# Patient Record
Sex: MALE | Race: BLACK OR AFRICAN AMERICAN | NOT HISPANIC OR LATINO
[De-identification: names, ages, dates, MRNs, and addresses within clinical notes are randomized per-mention and may not be internally consistent; named-entity substitution may affect disease eponyms.]

---

## 2021-12-15 ENCOUNTER — APPOINTMENT (OUTPATIENT)
Dept: PEDIATRIC ORTHOPEDIC SURGERY | Facility: CLINIC | Age: 71
End: 2021-12-15
Payer: MEDICARE

## 2021-12-15 ENCOUNTER — TRANSCRIPTION ENCOUNTER (OUTPATIENT)
Age: 71
End: 2021-12-15

## 2021-12-15 VITALS — HEIGHT: 70 IN | WEIGHT: 236 LBS | BODY MASS INDEX: 33.79 KG/M2

## 2021-12-15 DIAGNOSIS — S46.012A STRAIN OF MUSCLE(S) AND TENDON(S) OF THE ROTATOR CUFF OF LEFT SHOULDER, INITIAL ENCOUNTER: ICD-10-CM

## 2021-12-15 DIAGNOSIS — Z86.79 PERSONAL HISTORY OF OTHER DISEASES OF THE CIRCULATORY SYSTEM: ICD-10-CM

## 2021-12-15 DIAGNOSIS — Z86.39 PERSONAL HISTORY OF OTHER ENDOCRINE, NUTRITIONAL AND METABOLIC DISEASE: ICD-10-CM

## 2021-12-15 PROCEDURE — 99202 OFFICE O/P NEW SF 15 MIN: CPT

## 2021-12-15 RX ORDER — METFORMIN HYDROCHLORIDE 625 MG/1
TABLET ORAL
Refills: 0 | Status: ACTIVE | COMMUNITY

## 2021-12-15 RX ORDER — APIXABAN 5 MG/1
TABLET, FILM COATED ORAL
Refills: 0 | Status: ACTIVE | COMMUNITY

## 2021-12-15 NOTE — PHYSICAL EXAM
[de-identified] : On exam he has supple motion to the cervical spine without spasm or tenderness.  The left shoulder reveals no evidence of atrophy/swelling.  He has excellent motion on today's visit with no significant objective points of tenderness or instability on stress.  His strength is quite good for his age and nondominant side.  He is neurologically intact.\par \par Again I have reviewed his MRI findings which basically revealed tendinosis only with no significant tear or arthritic component present

## 2021-12-15 NOTE — ASSESSMENT
[FreeTextEntry1] : Impression: Strain rotator cuff left shoulder.\par \par He will be treated with formal physical therapy the potential for injection has been discussed.  Nonsteroidals would not be prescribed because of his cardiac situation and Eliquis he will return as necessary

## 2021-12-15 NOTE — HISTORY OF PRESENT ILLNESS
[de-identified] : This 71-year-old right-handed gentleman who is retired is seen for evaluation of his left shoulder.  He states he has had a long history of discomfort to his shoulder with mild increase in his symptomatology over the past 6 weeks or so.  Never any history of injury.  His pain does not radiate distally no neck pain numbness or paresthesias.  He has trouble more so at sleep.  He recently was seen by his internist who ordered an MRI of his shoulder.  This I have reviewed with the patient.  His past history is significant for cardiac disease atrial fibrillation hypertension he is on Eliquis as well as Metformin.